# Patient Record
Sex: MALE
[De-identification: names, ages, dates, MRNs, and addresses within clinical notes are randomized per-mention and may not be internally consistent; named-entity substitution may affect disease eponyms.]

---

## 2017-05-22 ENCOUNTER — HOSPITAL ENCOUNTER (EMERGENCY)
Dept: HOSPITAL 31 - C.ER | Age: 32
Discharge: HOME | End: 2017-05-22
Payer: COMMERCIAL

## 2017-05-22 VITALS
TEMPERATURE: 98.1 F | SYSTOLIC BLOOD PRESSURE: 131 MMHG | HEART RATE: 67 BPM | OXYGEN SATURATION: 99 % | DIASTOLIC BLOOD PRESSURE: 82 MMHG | RESPIRATION RATE: 20 BRPM

## 2017-05-22 DIAGNOSIS — S20.211A: Primary | ICD-10-CM
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
## 2017-05-22 NOTE — C.PDOC
History Of Present Illness


32 y.o male presents to ED with complaints of chest and rib pain after dropping 

200pound weight from barbells at gym one week ago. He states he was lifting and 

it slipped and the bar hit his chest. He has been taking Advil with mild relief

, but pain persists and worsens with movement and deep inspiration. Denies 

swelling, bruising, SOB. 


Time Seen by Provider: 05/22/17 21:59


Chief Complaint (Nursing): Chest Pain


History Per: Patient


History/Exam Limitations: no limitations


Onset/Duration Of Symptoms: Days (7)


Current Symptoms Are (Timing): Still Present





Past Medical History


Reviewed: Historical Data, Nursing Documentation, Vital Signs


Vital Signs: 


 Last Vital Signs











Temp  98.1 F   05/22/17 21:55


 


Pulse  67   05/22/17 21:55


 


Resp  20   05/22/17 21:55


 


BP  131/82   05/22/17 21:55


 


Pulse Ox  99   05/22/17 22:14














- Medical History


PMH: No Chronic Diseases


Surgical History: No Surg Hx


Family History: States: Unknown Family Hx





- Social History


Hx Alcohol Use: Yes


Hx Substance Use: No





- Immunization History


Hx Tetanus Toxoid Vaccination: No


Hx Influenza Vaccination: No


Hx Pneumococcal Vaccination: No





Review Of Systems


Constitutional: Negative for: Fever, Weakness, Malaise


Cardiovascular: Positive for: Chest Pain (rib pain).  Negative for: Palpitations


Respiratory: Negative for: Cough, Shortness of Breath


Gastrointestinal: Negative for: Vomiting, Abdominal Pain, Diarrhea


Musculoskeletal: Negative for: Neck Pain, Arm Pain


Skin: Negative for: Rash, Bruising


Neurological: Negative for: Headache, Dizziness





Physical Exam





- Physical Exam


Appears: Non-toxic, No Acute Distress


Skin: Warm, Dry, No Rash, No Ecchymosis


Head: Atraumatic, Normacephalic


Eye(s): bilateral: Normal Inspection


Oral Mucosa: Moist


Neck: Normal ROM


Chest: Symmetrical, No Deformity, Tenderness (anterior chest wall tenderness 

below nipple line, right lateral ribs mildly tender), No Ecchymosis, No 

Subcutaneous Emphysema


Cardiovascular: Rhythm Regular, No Murmur


Respiratory: Normal Breath Sounds, No Rhonchi, No Wheezing


Back: Normal Inspection, No Vertebral Tenderness, No Paraspinal Tenderness


Extremity: Normal ROM, No Deformity, No Swelling


Neurological/Psych: Oriented x3, Normal Speech





ED Course And Treatment


O2 Sat by Pulse Oximetry: 99





Medical Decision Making


Medical Decision Making: 


Impression: 32 y.o male with chest and rib pain s.p injury


Plan: Chest and rib xray


Progress:


Xray reviewed showing no acute fracture


Recommend analgesics, motrin or tylenol 





Disposition


Counseled Patient/Family Regarding: Need For Followup, Rx Given





- Disposition


Referrals: 


[facility] [Outside]


[facility] [Outside]


[facility] [Outside]


Disposition: HOME/ ROUTINE


Disposition Time: 22:36


Condition: STABLE


Additional Instructions: 


[place] Naproxen cada 6-8 horas para el dolor


Siga con amador mdico o clnica primaria en lanny semana para lanny evaluacin ms 

detallada y referencia ortopdica si el dolor persiste


La radiografa era normal


Prescriptions: 


Naproxen [Naprosyn] 1 tab PO BID PRN #25 tab


 PRN Reason: Pain


Instructions:  Rib Contusion (ED)


Print Language: Italian





- POA


Present On Arrival: None





- Clinical Impression


Clinical Impression: 


 Chest wall contusion

## 2017-05-23 NOTE — RAD
PROCEDURE:  Radiographs of the Chest and Right Ribs.



HISTORY:

pain to chest/rib s.p injury at gym



COMPARISON:

10/23/2012. 



TECHNIQUE:

Frontal radiograph of the chest and multiple oblique radiographs of 

the right ribs were obtained.



FINDINGS:



RIGHT RIBS:

No fracture or focal lesion visualized.



LUNGS:

Clear.



PLEURA:

No pneumothorax or pleural fluid.



CARDIOVASCULAR:

Normal sized heart. No pulmonary vascular congestion.



OTHER FINDINGS:

None.



IMPRESSION:

Unremarkable radiographs of the chest and right ribs. No right rib 

fracture. No significant interval change compared to the prior 

examination(s).



___________________________________________________________



Concordant results with the preliminary interpretation rendered by 

the emergency department physician

procedure.

## 2017-05-24 NOTE — CARD
--------------- APPROVED REPORT --------------





EKG Measurement

Heart [id number]

SC 150P-15

[id number]

[id number]

[id number]



<Conclusion>

Normal sinus rhythm

Minimal voltage criteria for LVH, may be normal variant

Borderline ECG

## 2017-11-15 ENCOUNTER — HOSPITAL ENCOUNTER (EMERGENCY)
Dept: HOSPITAL 31 - C.ER | Age: 32
Discharge: HOME | End: 2017-11-15
Payer: COMMERCIAL

## 2017-11-15 VITALS
RESPIRATION RATE: 20 BRPM | TEMPERATURE: 97.5 F | HEART RATE: 56 BPM | OXYGEN SATURATION: 98 % | SYSTOLIC BLOOD PRESSURE: 131 MMHG | DIASTOLIC BLOOD PRESSURE: 78 MMHG

## 2017-11-15 DIAGNOSIS — G43.909: Primary | ICD-10-CM

## 2017-11-15 NOTE — C.PDOC
History Of Present Illness


32 year old male presents to the ER with complaint of intermittent headache 

associated with light sensitivity and occasional tingling in the hands for the 

past 12 years.  He states his PMD is aware of symptoms, and he had a "test of 

the head" 6 years ago which was "normal".  Patient has not been evaluated by 

neurologist.   He comes to the ER today because yesterday he had another 

episode of the symptoms that was stronger than typical.   Symptoms from 

yesterday have since resolved.  Patient denies fever, visual changes, dizziness

, facial droop, slurred speech, neck pain/stiffness, extremity weakness, gait 

changes, nausea/vomiting.  


Time Seen by Provider: 11/15/17 09:58


Chief Complaint (Nursing): Headache


History Per: Patient


History/Exam Limitations: no limitations


Onset/Duration Of Symptoms: Persistent (12 years)


Current Symptoms Are (Timing): Gone


Preceeding Symptoms: Known Migraine Symptoms


Associated Symptoms: Photophobia, Other (Tingling in hands).  denies: Blurred 

Vision, Nausea, Vomiting, Extremity Weakness





Past Medical History


Reviewed: Historical Data, Nursing Documentation, Vital Signs


Vital Signs: 


 Last Vital Signs











Temp  97.5 F L  11/15/17 09:48


 


Pulse  56 L  11/15/17 09:48


 


Resp  20   11/15/17 09:48


 


BP  131/78   11/15/17 09:48


 


Pulse Ox  98   11/15/17 11:11














- Medical History


Other PMH: headaches x 12 years


Surgical History: No Surg Hx


Family History: States: No Known Family Hx





- Social History


Hx Alcohol Use: No


Hx Substance Use: No





- Immunization History


Hx Tetanus Toxoid Vaccination: No


Hx Influenza Vaccination: No


Hx Pneumococcal Vaccination: No





Review Of Systems


Except As Marked, All Systems Reviewed And Found Negative.


Constitutional: Negative for: Fever, Chills


Cardiovascular: Negative for: Chest Pain, Palpitations


Respiratory: Negative for: Cough, Shortness of Breath


Gastrointestinal: Negative for: Nausea, Vomiting, Abdominal Pain, Diarrhea


Musculoskeletal: Negative for: Neck Pain


Skin: Negative for: Rash


Neurological: Positive for: Headache, Other (light sensitivity, tingling in 

hands).  Negative for: Weakness, Incoordination, Change in Speech, Confusion, 

Seizures, Altered Mental Status, Dizziness





Physical Exam





- Physical Exam


Appears: Well, Non-toxic, No Acute Distress


Skin: Normal Color, Warm, Dry, No Rash


Head: Atraumatic, Normacephalic


Eye(s): bilateral: Normal Inspection, PERRL, EOMI


Oral Mucosa: Moist


Neck: Normal, Normal ROM, No Midline Cervical Tenderness, No Paracervical 

Tenderness, No Step Off Deformity, Supple, Other (no meningismus )


Cardiovascular: Rhythm Regular, No Murmur


Respiratory: Normal Breath Sounds, No Rales, No Rhonchi, No Wheezing


Gastrointestinal/Abdominal: Normal Exam, Bowel Sounds, Soft, No Tenderness


Extremity: Normal ROM


Extremity: Bilateral: Atraumatic, Normal Color And Temperature, Normal ROM


Neurological/Psych: Oriented x3, Normal Speech, Normal Cognition, Normal 

Cranial Nerves, No Cerebellar Signs, Normal Motor, Normal Sensation, No 

Dysarthria, No Romberg


Gait: Steady





ED Course And Treatment


O2 Sat by Pulse Oximetry: 98 (Room air)


Pulse Ox Interpretation: Normal


Progress Note: Patient requesting medication for nausea - Zofran ODT given.  He 

admits he did not visit PMD for symptoms because his PMD will charge him for 

office visit.  Explained to patient that a CT head is not the appropriate test 

for his typie of symptoms.  Patient instructed to follow up with neurology 

within 1 week for further evaluation, and to request Rx for outpatient MRI from 

his PMD/neuro.  Rxs for Fiorecet and Zofran ODT given.  He understands he 

should return to ED if symptoms worsen.





Disposition


Counseled Patient/Family Regarding: Diagnosis, Need For Followup, Rx Given





- Disposition


Referrals: 


Rony Corral MD [Medical Doctor] - 


Solomon Wayne MD [Staff Provider] - 


Disposition: HOME/ ROUTINE


Disposition Time: 10:20


Condition: STABLE


Additional Instructions: 


FOLLOW UP WITH YOUR DOCTOR IN 1-2 DAYS, AND WITH NEUROLOGIST WITHIN 1 WEEK





USE MEDICATION AS NEEDED





ASK YOUR DOCTOR FOR PRESCRIPTION FOR OUTPATIENT MRI





RETURN TO EMERGENCY ROOM IF SYMPTOMS WORSEN








SEGUIMIENTO CON TORRES MDICO EN 1-2 ROLON, Y CON NEURLOGO DENTRO DE 1 SEMANA





USE MEDICAMENTOS SEGN SEA NECESARIO





PREGUNTE A TORRES MDICO PARA LA PRESCRIPCIN PARA MRI AMBULATORIO





REGRESE AL NEELIMA DE EMERGENCIA SI LOS SNTOMAS EMPEORAN


Prescriptions: 


Acetaminophen/Butalbital/Caf [Fioricet] 1 tab PO TID PRN #20 tab


 PRN Reason: Headache


Ondansetron [Zofran Odt] 4 mg PO Q8 PRN #12 odt


 PRN Reason: Nausea/Vomiting


Instructions:  Migraine Headache (ED)


Forms:  [facility] (English)


Print Language: Romansh





- POA


Present On Arrival: None





- Clinical Impression


Clinical Impression: 


 Migraine








- Scribe Statement


The provider has reviewed the documentation as recorded by the Scribsherice Morataya





All medical record entries made by the Hubertibsherice were at my direction and 

personally dictated by me. I have reviewed the chart and agree that the record 

accurately reflects my personal performance of the history, physical exam, 

medical decision making, and the department course for this patient. I have 

also personally directed, reviewed, and agree with the discharge instructions 

and disposition.

## 2018-10-11 ENCOUNTER — HOSPITAL ENCOUNTER (EMERGENCY)
Dept: HOSPITAL 31 - C.ER | Age: 33
Discharge: HOME | End: 2018-10-11
Payer: COMMERCIAL

## 2018-10-11 VITALS
SYSTOLIC BLOOD PRESSURE: 131 MMHG | DIASTOLIC BLOOD PRESSURE: 95 MMHG | TEMPERATURE: 98.6 F | RESPIRATION RATE: 20 BRPM | OXYGEN SATURATION: 98 % | HEART RATE: 58 BPM

## 2018-10-11 VITALS — BODY MASS INDEX: 29.2 KG/M2

## 2018-10-11 DIAGNOSIS — K21.9: Primary | ICD-10-CM

## 2018-10-11 NOTE — C.PDOC
History Of Present Illness


34 y/o male, w/no significant PMhx, presents to the ER  complaining of dry 

mouth, sore throat, and acid reflux which has been present for the past 2 

months. Patient states that he wake up in the morning daily with dry mouth and 

sore throat. Patient is also complaining of increasing heartburn with substernal

chest pain and epigastric pain for the past 2 months. He notes that the pain is 

worse with laying flat after meals. He took an unknown OTC medication for the 

symptoms without relief. Denies having fever, chills, lightheadedness, visual 

changes, ear pain, neck pain, palpitations, diaphoresis, nausea, vomiting, and 

diarrhea.


Chief Complaint (Nursing): ENT Problem


History Per: Patient


History/Exam Limitations: None


Onset/Duration Of Symptoms: Days


Current Symptoms Are (Timing): Still Present


Severity: Moderate





Past Medical History


Reviewed: Historical Data, Nursing Documentation, Vital Signs


Vital Signs: 





                                Last Vital Signs











Temp  98.6 F   10/11/18 09:35


 


Pulse  58 L  10/11/18 09:35


 


Resp  20   10/11/18 09:35


 


BP  131/95 H  10/11/18 09:35


 


Pulse Ox  98   10/11/18 09:35














- Medical History


PMH: No Chronic Diseases


Surgical History: No Surg Hx


Family History: States: No Known Family Hx





- Social History


Hx Alcohol Use: No


Hx Substance Use: No





- Immunization History


Hx Tetanus Toxoid Vaccination: No


Hx Influenza Vaccination: No


Hx Pneumococcal Vaccination: No





Review Of Systems


Except As Marked, All Systems Reviewed And Found Negative.


Constitutional: Negative for: Fever, Chills


Eyes: Negative for: Vision Change


ENT: Positive for: Throat Pain, Other (dry mouth).  Negative for: Ear Pain


Cardiovascular: Negative for: Chest Pain, Palpitations


Respiratory: Negative for: Cough, Shortness of Breath


Gastrointestinal: Positive for: Abdominal Pain (epigastric), Other (acid 

reflux).  Negative for: Nausea, Vomiting, Diarrhea, Constipation, Melena, Hem

atochezia, Hematemesis


Musculoskeletal: Negative for: Neck Pain, Shoulder Pain, Arm Pain, Back Pain, 

Hand Pain, Leg Pain, Foot Pain


Skin: Negative for: Rash


Neurological: Negative for: Weakness, Numbness, Headache





Physical Exam





- Physical Exam


Appears: Non-toxic


Skin: Normal Color, Warm


Head: Atraumatic, Normacephalic


Eye(s): bilateral: Normal Inspection, PERRL, EOMI


Ear(s): Bilateral: Normal


Nose: Normal


Oral Mucosa: Moist


Tongue: Normal Appearing


Lips: Normal Appearing


Teeth: Normal Dentition


Gingiva: Normal Appearing


Throat: Normal, No Erythema, No Exudate


Neck: Normal, Normal ROM, Supple


Lymphatic: Normal Exam


Chest: Symmetrical


Cardiovascular: Rhythm Regular


Respiratory: Normal Breath Sounds, No Rales, No Rhonchi, No Wheezing


Gastrointestinal/Abdominal: Bowel Sounds, Soft, Tenderness (mild epigastric 

tenderness), No Mass, No Guarding, No Rebound


Pulses: Left Radial: Normal, Right Radial: Normal


Neurological/Psych: Oriented x3, Normal Speech, Normal Cognition, Normal Cranial

Nerves, No Cerebellar Signs, Normal Motor, Normal Sensation





ED Course And Treatment


O2 Sat by Pulse Oximetry: 98 (RA)


Pulse Ox Interpretation: Normal





Medical Decision Making


Medical Decision Making: 


Impression:


 Acid Reflux





Plan:


Take omeprazole 1 pill every morning


Take pepcid 1 pill every 12 hours as needed for acid reflux


Followup with clinic or primary doctor within 2 days


Return to Emergency department if symptoms worsen





Disposition





- Disposition


Referrals: 


[facility] [Outside]


Disposition: HOME/ ROUTINE


Disposition Time: 10:08


Condition: GOOD


Additional Instructions: 


[place] omeprazole 1 pldora cada maana


[place] Pepcid 1 pldora cada 12 horas segn sea necesario para el reflujo cido


Seguimiento con clnica o mdico de cabecera en 2 schultz


Regresar al Departamento de emergencias si los sntomas empeoran


Prescriptions: 


Famotidine [Pepcid] 20 mg PO Q12H PRN #30 tab


 PRN Reason: reflux


Omeprazole 40 mg PO DAILY 30 Days #30 tab


Instructions:  Acid Reflux (Gastroesophageal Reflux Disease), Adult (DC)


Forms:  [employer] (Monegasque)


Print Language: Swedish





- Clinical Impression


Clinical Impression: 


 Acid reflux








- PA / NP / Resident Statement


MD/DO has reviewed & agrees with the documentation as recorded.





- Scribe Statement


The provider has reviewed the documentation as recorded by the Scribe


Niecy Gama





Provider Attestation





All medical record entries made by the Scribe were at my direction and 

personally dictated by me. I have reviewed the chart and agree that the record 

accurately reflects my personal performance of the history, physical exam, 

medical decision making, and the department course for this patient. I have also

personally directed, reviewed, and agree with the discharge instructions and 

disposition.

## 2018-12-14 ENCOUNTER — HOSPITAL ENCOUNTER (EMERGENCY)
Dept: HOSPITAL 31 - C.ER | Age: 33
LOS: 1 days | Discharge: HOME | End: 2018-12-15
Payer: COMMERCIAL

## 2018-12-14 VITALS — BODY MASS INDEX: 29.2 KG/M2

## 2018-12-14 DIAGNOSIS — R10.31: ICD-10-CM

## 2018-12-14 DIAGNOSIS — K59.00: Primary | ICD-10-CM

## 2018-12-14 PROCEDURE — 96361 HYDRATE IV INFUSION ADD-ON: CPT

## 2018-12-14 PROCEDURE — 99285 EMERGENCY DEPT VISIT HI MDM: CPT

## 2018-12-14 PROCEDURE — 96374 THER/PROPH/DIAG INJ IV PUSH: CPT

## 2018-12-14 PROCEDURE — 81001 URINALYSIS AUTO W/SCOPE: CPT

## 2018-12-14 PROCEDURE — 80053 COMPREHEN METABOLIC PANEL: CPT

## 2018-12-14 PROCEDURE — 83690 ASSAY OF LIPASE: CPT

## 2018-12-14 PROCEDURE — 74177 CT ABD & PELVIS W/CONTRAST: CPT

## 2018-12-14 PROCEDURE — 85025 COMPLETE CBC W/AUTO DIFF WBC: CPT

## 2018-12-15 VITALS
OXYGEN SATURATION: 97 % | RESPIRATION RATE: 16 BRPM | HEART RATE: 51 BPM | TEMPERATURE: 97.6 F | DIASTOLIC BLOOD PRESSURE: 84 MMHG | SYSTOLIC BLOOD PRESSURE: 143 MMHG

## 2018-12-15 LAB
ALBUMIN SERPL-MCNC: 4.4 [, G/DL] (ref 3.5–5)
ALBUMIN/GLOB SERPL: 1.8 [,] (ref 1–2.1)
ALT SERPL-CCNC: 30 [, U/L] (ref 21–72)
AST SERPL-CCNC: 32 [, U/L] (ref 17–59)
BASOPHILS # BLD AUTO: 0 [, K/UL] (ref 0–0.2)
BASOPHILS NFR BLD: 0.4 [, %] (ref 0–2)
BILIRUB UR-MCNC: NEGATIVE [,]
BUN SERPL-MCNC: 16 [, MG/DL] (ref 9–20)
CALCIUM SERPL-MCNC: 9.1 [, MG/DL] (ref 8.6–10.4)
EOSINOPHIL # BLD AUTO: 0.3 [, K/UL] (ref 0–0.7)
EOSINOPHIL NFR BLD: 3.7 [, %] (ref 0–4)
ERYTHROCYTE [DISTWIDTH] IN BLOOD BY AUTOMATED COUNT: 13.4 [, %] (ref 11.5–14.5)
GFR NON-AFRICAN AMERICAN: > 60 [,]
GLUCOSE UR STRIP-MCNC: NORMAL [, MG/DL]
HGB BLD-MCNC: 15.4 [, G/DL] (ref 12–18)
LEUKOCYTE ESTERASE UR-ACNC: (no result) [, LEU/UL]
LIPASE: 53 [, U/L] (ref 23–300)
LYMPHOCYTES # BLD AUTO: 2.6 [, K/UL] (ref 1–4.3)
LYMPHOCYTES NFR BLD AUTO: 31.7 [, %] (ref 20–40)
MCH RBC QN AUTO: 30.6 [, PG] (ref 27–31)
MCHC RBC AUTO-ENTMCNC: 35.3 [, G/DL] (ref 33–37)
MCV RBC AUTO: 86.6 [, FL] (ref 80–94)
MONOCYTES # BLD: 0.6 [, K/UL] (ref 0–0.8)
MONOCYTES NFR BLD: 7.8 [, %] (ref 0–10)
NEUTROPHILS # BLD: 4.6 [, K/UL] (ref 1.8–7)
NEUTROPHILS NFR BLD AUTO: 56.4 [, %] (ref 50–75)
NRBC BLD AUTO-RTO: 0.1 [, %] (ref 0–2)
PH UR STRIP: 7 [,] (ref 5–8)
PLATELET # BLD: 240 [, K/UL] (ref 130–400)
PMV BLD AUTO: 8.2 [, FL] (ref 7.2–11.7)
PROT UR STRIP-MCNC: NEGATIVE [, MG/DL]
RBC # BLD AUTO: 5.04 [, MIL/UL] (ref 4.4–5.9)
RBC # UR STRIP: NEGATIVE [,]
SP GR UR STRIP: 1.02 [,] (ref 1–1.03)
UROBILINOGEN UR-MCNC: NORMAL [, MG/DL] (ref 0.2–1)
WBC # BLD AUTO: 8.1 [, K/UL] (ref 4.8–10.8)

## 2018-12-15 NOTE — C.PDOC
History Of Present Illness


33 year old male presents to the ER with a complaint of RLQ pain intermittently 

for the past few days that worsened today. Denies nausea, vomiting, or diarrhea.


Chief Complaint (Nursing): Abdominal Pain


History Per: Patient


History/Exam Limitations: no limitations


Onset/Duration Of Symptoms: Days, Intermittent Episodes


Current Symptoms Are (Timing): Still Present


Location Of Pain/Discomfort: RLQ


Quality Of Discomfort: Unable To Describe


Associated Symptoms: denies: Nausea, Vomiting, Diarrhea


Exacerbating Factors: None


Alleviating Factors: None


Recent travel outside of the [place]: No





Past Medical History


Reviewed: Historical Data, Nursing Documentation, Vital Signs


Vital Signs: 





                                Last Vital Signs











Temp  98 F   12/14/18 23:27


 


Pulse  65   12/14/18 23:27


 


Resp  16   12/14/18 23:27


 


BP      


 


Pulse Ox  96   12/14/18 23:27











Family History: States: Unknown Family Hx





- Social History


Hx Alcohol Use: No


Hx Substance Use: No





- Immunization History


Hx Tetanus Toxoid Vaccination: No


Hx Influenza Vaccination: No


Hx Pneumococcal Vaccination: No





Review Of Systems


Constitutional: Negative for: Fever, Chills


Cardiovascular: Negative for: Chest Pain, Palpitations


Respiratory: Negative for: Cough, Shortness of Breath


Gastrointestinal: Positive for: Abdominal Pain.  Negative for: Nausea, Vomiting,

Diarrhea


Genitourinary: Negative for: Dysuria, Hematuria





Physical Exam





- Physical Exam


Appears: Non-toxic


Skin: Normal Color, Warm, Dry


Head: Atraumatic, Normacephalic


Eye(s): bilateral: Normal Inspection


Oral Mucosa: Moist


Neck: Normal, Supple


Chest: Symmetrical, No Tenderness


Cardiovascular: Rhythm Regular


Respiratory: Normal Breath Sounds, No Rales, No Rhonchi, No Wheezing


Gastrointestinal/Abdominal: Soft, Tenderness (RLQ, Hypogastric), No Guarding, No

Rebound


Back: No CVA Tenderness


Neurological/Psych: Oriented x3, Normal Speech





ED Course And Treatment





- Laboratory Results


Result Diagrams: 


                                 12/14/18 23:59





                                 12/14/18 23:59


O2 Sat by Pulse Oximetry: 96 (Room air)


Pulse Ox Interpretation: Normal


Progress Note: CT abd/pel, blood work, and urinalysis. IV fluids and toradol 

administered.





Disposition


Counseled Patient/Family Regarding: Diagnosis





- Disposition


Referrals: 


[facility] [Outside]


Disposition: HOME/ ROUTINE


Disposition Time: 03:54


Condition: STABLE


Prescriptions: 


Docusate Sodium [Colace] 100 mg PO BID #20 capsule


Instructions:  Constipation in Adults


Forms:  [facility] (English), Gen Discharge Inst Romanian


Print Language: Dominican





- POA


Present On Arrival: None





- Clinical Impression


Clinical Impression: 


 Abdominal pain, Constipation








- Scribe Statement


The provider has reviewed the documentation as recorded by the Scribe





Bayron Morataya





All medical record entries made by the Hubertibe were at my direction and 

personally dictated by me. I have reviewed the chart and agree that the record 

accurately reflects my personal performance of the history, physical exam, 

medical decision making, and the department course for this patient. I have also

 personally directed, reviewed, and agree with the discharge instructions and 

disposition.

## 2018-12-15 NOTE — CT
Date of service: 



12/15/2018



PROCEDURE:  CT Abdomen and Pelvis with contrast



HISTORY:

RLQ abd pain



COMPARISON:

None.



TECHNIQUE:

Contrast dose: 100 mL of Visipaque 320.



Axial and reformatted coronal and sagittal CT images of the abdomen 

and pelvis were obtained after IV and oral contrast administration.



Radiation dose:



Total exam DLP = 384.05 mGy-cm.



This CT exam was performed using one or more of the following dose 

reduction techniques: Automated exposure control, adjustment of the 

mA and/or kV according to patient size, and/or use of iterative 

reconstruction technique.



FINDINGS:



LOWER THORAX:

Unremarkable. 



LIVER:

Unremarkable. No gross lesion or ductal dilatation. 



GALLBLADDER AND BILE DUCTS:

Unremarkable. 



PANCREAS:

Unremarkable. No gross lesion or ductal dilatation.



SPLEEN:

Unremarkable. 



ADRENALS:

Unremarkable. No mass. 



KIDNEYS AND URETERS:

Unremarkable. No hydronephrosis. No solid mass. 



VASCULATURE:

Unremarkable. No aortic aneurysm. No aortic atherosclerotic 

calcification or mural plaque present.



BOWEL:

Mild-to-moderate constipation.  No obstruction. No gross mural 

thickening. 



APPENDIX:

No CT evidence of acute appendicitis. 



PERITONEUM:

Unremarkable. No free fluid. No free air. 



LYMPH NODES:

Unremarkable. No enlarged lymph nodes. 



BLADDER:

The urinary bladder is mildly to moderately distended. 



REPRODUCTIVE:

Unremarkable. 



BONES:

No acute fracture. 



OTHER FINDINGS:

None.



IMPRESSION:

No definite CT evidence of acute appendicitis.



Mild-to-moderate constipation.



Preliminary report was submitted by [facility] contains concordant 

findings.

## 2019-04-01 ENCOUNTER — HOSPITAL ENCOUNTER (EMERGENCY)
Dept: HOSPITAL 31 - C.ER | Age: 34
Discharge: HOME | End: 2019-04-01
Payer: COMMERCIAL

## 2019-04-01 VITALS — OXYGEN SATURATION: 98 %

## 2019-04-01 VITALS — HEART RATE: 56 BPM | SYSTOLIC BLOOD PRESSURE: 130 MMHG | DIASTOLIC BLOOD PRESSURE: 78 MMHG | TEMPERATURE: 97.7 F

## 2019-04-01 VITALS — RESPIRATION RATE: 18 BRPM

## 2019-04-01 VITALS — BODY MASS INDEX: 29.2 KG/M2

## 2019-04-01 DIAGNOSIS — R05: ICD-10-CM

## 2019-04-01 DIAGNOSIS — J06.9: Primary | ICD-10-CM

## 2019-04-01 DIAGNOSIS — H10.10: ICD-10-CM

## 2019-04-01 NOTE — C.PDOC
History Of Present Illness


34 year old male presents to the ED for evaluation of dry cough which began 5 

days ago. Patient also reports associated fever and sore throat. Patient has 

been taking Tylenol, which has helped the fever, but the dry cough and sore 

throat still persists. Patient denies sick contacts, history of seasonal 

allergies, headache, chest pain, shortness of breath, nausea, vomiting, 

diarrhea, and abdominal pain. 


Chief Complaint (Nursing): Cough, Cold, Congestion


History Per: Patient,  ([id number])


History/Exam Limitations: language barrier


Onset/Duration Of Symptoms: Days


Current Symptoms Are (Timing): Still Present


Associated Symptoms: Fever, Sore Throat, Cough.  denies: Sputum, Nausea, 

Vomiting, Diarrhea


Additional History Per: Patient





Past Medical History


Reviewed: Historical Data, Nursing Documentation, Vital Signs


Vital Signs: 





                                Last Vital Signs











Temp  98.5 F   04/01/19 11:36


 


Pulse  60   04/01/19 11:36


 


Resp  18   04/01/19 11:36


 


BP  133/84   04/01/19 11:36


 


Pulse Ox  98   04/01/19 11:36














- Medical History


PMH: No Chronic Diseases


Surgical History: No Surg Hx


Family History: States: Unknown Family Hx





- Social History


Hx Alcohol Use: No


Hx Substance Use: No





- Immunization History


Hx Tetanus Toxoid Vaccination: No


Hx Influenza Vaccination: No


Hx Pneumococcal Vaccination: No





Review Of Systems


Constitutional: Positive for: Fever


ENT: Positive for: Throat Pain


Cardiovascular: Negative for: Chest Pain


Respiratory: Positive for: Cough.  Negative for: Shortness of Breath, Sputum


Gastrointestinal: Negative for: Nausea, Vomiting, Abdominal Pain, Diarrhea


Neurological: Negative for: Weakness, Numbness, Headache





Physical Exam





- Physical Exam


Appears: Non-toxic, No Acute Distress


Skin: Normal Color, Warm, Dry


Head: Atraumatic, Normacephalic


Eye(s): bilateral: PERRL, EOMI, Other (scleral injection )


Ear(s): Bilateral: Normal


Nose: Normal, No Discharge


Oral Mucosa: Moist


Throat: Normal, No Erythema, No Exudate


Neck: Supple


Chest: Symmetrical, No Deformity, No Tenderness


Cardiovascular: Rhythm Regular, No Murmur


Respiratory: Normal Breath Sounds, No Rales, No Rhonchi, No Wheezing


Extremity: Normal ROM, Capillary Refill (less than 2 seconds )


Neurological/Psych: Normal Speech, Normal Cognition





ED Course And Treatment


O2 Sat by Pulse Oximetry: 98 (on RA )


Pulse Ox Interpretation: Normal





Disposition


Counseled Patient/Family Regarding: Diagnosis, Need For Followup, Rx Given





- Disposition


Referrals: 


[facility] [Outside]


Disposition: HOME/ ROUTINE


Disposition Time: 13:47


Condition: STABLE


Additional Instructions: 


Continue Tessalon Perles three times a day as needed for cough


Rest and Hydration


Follow up in Clinic in 1-2 days 


Return to ED if symptoms worsen





Prescriptions: 


Benzonatate [Tessalon Perles] 100 mg PO TID #30 sgl


Olopatadine HCl [Patanol] 1 drop OU BID #1 bottle


Instructions:  Viral Upper Respiratory Infection, Adult (DC), Conjunctivitis 

(Noninfectious Pinkeye) (DC)


Forms:  [employer] (Greenlandic)


Print Language: French





- Clinical Impression


Clinical Impression: 


 Upper respiratory infection, Cough, Allergic conjunctivitis








- PA / NP / Resident Statement


MD/DO has reviewed & agrees with the documentation as recorded.





- Scribe Statement


The provider has reviewed the documentation as recorded by the Scribe (Alanna Matthews)








All medical record entries made by the Scribe were at my direction and 

personally dictated by me. I have reviewed the chart and agree that the record 

accurately reflects my personal performance of the history, physical exam, 

medical decision making, and the department course for this patient. I have also

 personally directed, reviewed, and agree with the discharge instructions and 

disposition.

## 2019-04-08 ENCOUNTER — HOSPITAL ENCOUNTER (EMERGENCY)
Dept: HOSPITAL 31 - C.ER | Age: 34
Discharge: HOME | End: 2019-04-08
Payer: COMMERCIAL

## 2019-04-08 VITALS
SYSTOLIC BLOOD PRESSURE: 119 MMHG | HEART RATE: 54 BPM | OXYGEN SATURATION: 99 % | DIASTOLIC BLOOD PRESSURE: 72 MMHG | TEMPERATURE: 98.2 F | RESPIRATION RATE: 18 BRPM

## 2019-04-08 VITALS — BODY MASS INDEX: 29.2 KG/M2

## 2019-04-08 DIAGNOSIS — K04.7: Primary | ICD-10-CM

## 2019-04-08 DIAGNOSIS — K08.89: ICD-10-CM

## 2019-04-08 NOTE — C.PDOC
History Of Present Illness


34 year old male presents to ED with complaint of left upper gum pain for the 

past 3-4 weeks. Patient states that over the past week the area become white and

white discharge came out. Patient states that he has not seen a dentist.  He 

states that he has been taking Tylenol for pain as needed. He denies fever and 

trauma. 





Time Seen by Provider: 04/08/19 09:59


Chief Complaint (Nursing): Dental Pain


History Per: Patient


History/Exam Limitations: no limitations


Onset/Duration Of Symptoms: Other (3-4 weeks)


Current Symptoms Are (Timing): Still Present


Quality: Positive for: "Pain"





Past Medical History


Reviewed: Historical Data, Nursing Documentation, Vital Signs


Vital Signs: 





                                Last Vital Signs











Temp  98.2 F   04/08/19 09:51


 


Pulse  54 L  04/08/19 09:51


 


Resp  18   04/08/19 09:51


 


BP  119/72   04/08/19 09:51


 


Pulse Ox  99   04/08/19 09:51














- Medical History


PMH: No Chronic Diseases


Surgical History: No Surg Hx


Family History: States: Unknown Family Hx





- Social History


Hx Alcohol Use: No


Hx Substance Use: No





- Immunization History


Hx Tetanus Toxoid Vaccination: No


Hx Influenza Vaccination: No


Hx Pneumococcal Vaccination: No





Review Of Systems


Constitutional: Negative for: Fever


ENT: Positive for: Other (left upper gum pain with white discharge)


Neurological: Negative for: Numbness





Physical Exam





- Physical Exam


Appears: Well, Non-toxic, No Acute Distress, Other (comfortable)


Skin: Normal Color, Warm, Dry


Head: Atraumatic, Normacephalic


Tongue: No Swelling


Lips: No Swelling


Teeth: No Caries


Gingiva: No Erythema, No Swelling, Other (Pustule immediately to the upper gum 

at teeth 9 and 10, no fluctuance, no induration)


Neck: Normal ROM, Supple


Chest: Symmetrical, No Deformity


Cardiovascular: Rhythm Regular, No Murmur


Respiratory: No Accessory Muscle Use, No Rales, No Rhonchi, No Wheezing


Extremity: Capillary Refill (<2 seconds)


Neurological/Psych: Oriented x3, Normal Speech, Normal Cognition





ED Course And Treatment


O2 Sat by Pulse Oximetry: 99 (in RA)


Progress Note: Patient given Amoxicillin PO, Ibuprofen PO, and Oragel. Patient 

instructed to follow up with dentist.  Re-evaluation.  Patient feels better.  

Discussed results and plan with patient who expresses understanding.  All 

questions answered and there is agreement with the plan to discharge home with 

instructions.  Patient stable for discharge.  Return if symptoms persist or 

worsen.





Disposition


Counseled Patient/Family Regarding: Diagnosis, Need For Followup, Rx Given





- Disposition


Referrals: 


[facility] [Outside]


Disposition: HOME/ ROUTINE


Disposition Time: 10:45


Condition: STABLE


Additional Instructions: 


FOLLOW UP WITH DENTIST AT [facility] WITHIN 1 WEEK





USE MEDICATIONS AS NEEDED





RETURN TO EMERGENCY ROOM IF YOUR SYMPTOMS BECOME WORSE








SIG[facility] CON EL [facility] [place] DE 1 SEMANA





[facility]IL[facility] MEDICAMENT[facility] ANETA SE NECESITE





VUELVA A LA NEELIMA DE EMERGENCIA [facility]


Prescriptions: 


Amoxicillin 875 mg PO BID #14 tab


Benzocaine 7.5% [Orajel 7.5%] 1 appl MM TID PRN #1 tube


 PRN Reason: pain


Ibuprofen [Motrin Tab] 600 mg PO Q6 PRN #30 tab


 PRN Reason: fever/pain


Instructions:  Dental Pain (DC)


Forms:  [employer] (English)


Print Language: Sinhala





- Clinical Impression


Clinical Impression: 


 Pain, dental, Dental abscess








- Scribe Statement


The provider has reviewed the documentation as recorded by the Scribe (Kiana Jones)








All medical record entries made by the Scribe were at my direction and 

personally dictated by me. I have reviewed the chart and agree that the record 

accurately reflects my personal performance of the history, physical exam, 

medical decision making, and the department course for this patient. I have also

personally directed, reviewed, and agree with the discharge instructions and 

disposition.